# Patient Record
Sex: FEMALE | Race: WHITE | Employment: OTHER | URBAN - NONMETROPOLITAN AREA
[De-identification: names, ages, dates, MRNs, and addresses within clinical notes are randomized per-mention and may not be internally consistent; named-entity substitution may affect disease eponyms.]

---

## 2021-06-17 PROBLEM — N18.2 CKD (CHRONIC KIDNEY DISEASE) STAGE 2, GFR 60-89 ML/MIN: Status: ACTIVE | Noted: 2021-06-17

## 2022-06-28 ENCOUNTER — APPOINTMENT (OUTPATIENT)
Dept: GENERAL RADIOLOGY | Age: 67
End: 2022-06-28
Attending: ANESTHESIOLOGY
Payer: MEDICARE

## 2022-06-28 ENCOUNTER — HOSPITAL ENCOUNTER (OUTPATIENT)
Age: 67
Setting detail: OUTPATIENT SURGERY
Discharge: HOME | End: 2022-06-28
Attending: ANESTHESIOLOGY | Admitting: ANESTHESIOLOGY
Payer: MEDICARE

## 2022-06-28 VITALS
DIASTOLIC BLOOD PRESSURE: 71 MMHG | WEIGHT: 160 LBS | OXYGEN SATURATION: 96 % | HEIGHT: 59 IN | TEMPERATURE: 97.4 F | BODY MASS INDEX: 32.25 KG/M2 | RESPIRATION RATE: 17 BRPM | HEART RATE: 58 BPM | SYSTOLIC BLOOD PRESSURE: 164 MMHG

## 2022-06-28 PROCEDURE — 2709999900 HC NON-CHARGEABLE SUPPLY: Performed by: ANESTHESIOLOGY

## 2022-06-28 PROCEDURE — 3209999900 FLUORO FOR SURGICAL PROCEDURES

## 2022-06-28 PROCEDURE — 6360000002 HC RX W HCPCS: Performed by: ANESTHESIOLOGY

## 2022-06-28 PROCEDURE — 2500000003 HC RX 250 WO HCPCS: Performed by: ANESTHESIOLOGY

## 2022-06-28 PROCEDURE — 3600000002 HC SURGERY LEVEL 2 BASE: Performed by: ANESTHESIOLOGY

## 2022-06-28 PROCEDURE — 6360000004 HC RX CONTRAST MEDICATION: Performed by: ANESTHESIOLOGY

## 2022-06-28 RX ORDER — BUPIVACAINE HYDROCHLORIDE 5 MG/ML
INJECTION, SOLUTION EPIDURAL; INTRACAUDAL PRN
Status: DISCONTINUED | OUTPATIENT
Start: 2022-06-28 | End: 2022-06-28 | Stop reason: ALTCHOICE

## 2022-06-28 RX ORDER — SODIUM CHLORIDE 9 MG/ML
INJECTION, SOLUTION INTRAVENOUS PRN
Status: DISCONTINUED | OUTPATIENT
Start: 2022-06-28 | End: 2022-06-28 | Stop reason: HOSPADM

## 2022-06-28 RX ORDER — DEXAMETHASONE SODIUM PHOSPHATE 4 MG/ML
INJECTION, SOLUTION INTRA-ARTICULAR; INTRALESIONAL; INTRAMUSCULAR; INTRAVENOUS; SOFT TISSUE PRN
Status: DISCONTINUED | OUTPATIENT
Start: 2022-06-28 | End: 2022-06-28 | Stop reason: ALTCHOICE

## 2022-06-28 RX ORDER — SODIUM CHLORIDE 0.9 % (FLUSH) 0.9 %
5-40 SYRINGE (ML) INJECTION EVERY 12 HOURS SCHEDULED
Status: DISCONTINUED | OUTPATIENT
Start: 2022-06-28 | End: 2022-06-28 | Stop reason: HOSPADM

## 2022-06-28 RX ORDER — SODIUM CHLORIDE, SODIUM LACTATE, POTASSIUM CHLORIDE, CALCIUM CHLORIDE 600; 310; 30; 20 MG/100ML; MG/100ML; MG/100ML; MG/100ML
INJECTION, SOLUTION INTRAVENOUS CONTINUOUS
Status: DISCONTINUED | OUTPATIENT
Start: 2022-06-28 | End: 2022-06-28 | Stop reason: HOSPADM

## 2022-06-28 RX ORDER — SODIUM CHLORIDE 0.9 % (FLUSH) 0.9 %
5-40 SYRINGE (ML) INJECTION PRN
Status: DISCONTINUED | OUTPATIENT
Start: 2022-06-28 | End: 2022-06-28 | Stop reason: HOSPADM

## 2022-06-28 RX ORDER — LIDOCAINE HYDROCHLORIDE 10 MG/ML
INJECTION, SOLUTION EPIDURAL; INFILTRATION; INTRACAUDAL; PERINEURAL PRN
Status: DISCONTINUED | OUTPATIENT
Start: 2022-06-28 | End: 2022-06-28 | Stop reason: ALTCHOICE

## 2022-06-28 RX ORDER — METHYLPREDNISOLONE ACETATE 40 MG/ML
INJECTION, SUSPENSION INTRA-ARTICULAR; INTRALESIONAL; INTRAMUSCULAR; SOFT TISSUE PRN
Status: DISCONTINUED | OUTPATIENT
Start: 2022-06-28 | End: 2022-06-28 | Stop reason: ALTCHOICE

## 2022-06-28 ASSESSMENT — PAIN - FUNCTIONAL ASSESSMENT: PAIN_FUNCTIONAL_ASSESSMENT: 0-10

## 2022-06-28 NOTE — PROGRESS NOTES
Patient continues to deny discomfort at this time. Patient discharged ambulatory, gait remains steady at this time.

## 2022-06-28 NOTE — PROGRESS NOTES
Received patient for care from OR Procedure room 1, patient ambulates from procedure room, gait steady, VS obtained, patient denies discomfort at this time, band-aid to left hip CDI.

## 2022-06-28 NOTE — OP NOTE
Procedure Note    Patient Name: Naomie Silva   YOB: 1955  Room/Bed: Room/bed info not found  Medical Record Number: [id number]  Date: 6/28/2022           Mallampati Airway Assessment:  Mallampati Class II - (soft palate, fauces & uvula are visible)    ASA Classification:  Class 2 - A normal healthy patient with mild systemic disease      Preoperative Diagnosis:   1.  left hip joint osteoarthritis. Postoperative Diagnosis:   1. left hip joint osteoarthritis. Procedure Performed:  1.  left hip joint injection. Indication for the Procedure: The patient is complaining of pain in the left hip area of longstanding duration. Patient's pain is not responding to conservative measures and is interfering with activities of daily living. Physical examination revealed tenderness over the joint and movements of the joint are painful. Jj's test is positive with patient complaining of pain in the hip. As patient is not responding to conservative management and interfering with activities of daily living we decided to proceed with left hip injection. The procedure and risks were discussed with the patient and an informed consent was obtained. Procedure: The patient is placed in supine position with left hip identified under fluoroscopic guidance. The skin over the hip joint was preped and draped in sterile manner. The skin and deep tissues over the left hip was infilitrated with 5 ml of lidocaine. The # 23-gauge, 3.5 inch long needle was inserted through the skin under fluroscopy such that the tip of the needle lies in the joint space. This was conformed by injecting 1 ml of Omnipaque and observing the spread of contrast in the joint space under fluoroscopy. Then after negative aspiration a total of 3 ml of 0.5% Marcaine with 40 mg of Depo-Medrol and  4 mg of Dexamethasone was injected. The needle was withdrawn and Band-Aid was applied to injection site.       Patient's vital signs remained stable and tolerated the procedure well. Patient was discharged home in stable condition and will be followed in the pain clinic in the next few weeks for further planning.     EBL: None

## 2022-06-28 NOTE — PROGRESS NOTES
Discharge instructions given to patient, verbalizes understanding and offers no questions at this time.

## (undated) DEVICE — AVANOS* UNIVERSAL BLOCK TRAYS: Brand: AVANOS

## (undated) DEVICE — PAIN TRAY: Brand: MEDLINE INDUSTRIES, INC.

## (undated) DEVICE — SET EXTN TBNG MINIBOR 20IN